# Patient Record
Sex: MALE | Race: WHITE
[De-identification: names, ages, dates, MRNs, and addresses within clinical notes are randomized per-mention and may not be internally consistent; named-entity substitution may affect disease eponyms.]

---

## 2021-01-22 ENCOUNTER — HOSPITAL ENCOUNTER (EMERGENCY)
Dept: HOSPITAL 62 - ER | Age: 66
Discharge: HOME | End: 2021-01-22
Payer: SELF-PAY

## 2021-01-22 VITALS — SYSTOLIC BLOOD PRESSURE: 130 MMHG | DIASTOLIC BLOOD PRESSURE: 86 MMHG

## 2021-01-22 DIAGNOSIS — K62.89: ICD-10-CM

## 2021-01-22 DIAGNOSIS — K59.09: ICD-10-CM

## 2021-01-22 DIAGNOSIS — R11.0: ICD-10-CM

## 2021-01-22 DIAGNOSIS — M79.605: ICD-10-CM

## 2021-01-22 DIAGNOSIS — Z79.891: ICD-10-CM

## 2021-01-22 DIAGNOSIS — R10.84: Primary | ICD-10-CM

## 2021-01-22 DIAGNOSIS — G89.29: ICD-10-CM

## 2021-01-22 PROCEDURE — S0119 ONDANSETRON 4 MG: HCPCS

## 2021-01-22 PROCEDURE — 74018 RADEX ABDOMEN 1 VIEW: CPT

## 2021-01-22 PROCEDURE — 96374 THER/PROPH/DIAG INJ IV PUSH: CPT

## 2021-01-22 PROCEDURE — 93010 ELECTROCARDIOGRAM REPORT: CPT

## 2021-01-22 PROCEDURE — 93005 ELECTROCARDIOGRAM TRACING: CPT

## 2021-01-22 PROCEDURE — 99285 EMERGENCY DEPT VISIT HI MDM: CPT

## 2021-01-22 NOTE — RADIOLOGY REPORT (SQ)
EXAM DESCRIPTION:  KUB/ABDOMEN (SINGLE VIEW)



IMAGES COMPLETED DATE/TIME:  1/22/2021 11:50 am



REASON FOR STUDY:  abd pain, constipation



COMPARISON:  None.



NUMBER OF VIEWS:  Two views.



TECHNIQUE:  AP supine and upright views of the abdomen were obtained.



LIMITATIONS:  None.



FINDINGS:  BOWEL GAS PATTERN: There is a moderate colorectal fecal burden.  There are no dilated loop
s of bowel or differential fluid levels.

CALCIFICATIONS: Pelvic phlebolith.

SOFT TISSUES: No acute gross abnormality.

HARDWARE: None in the abdomen.

BONES: Degenerative spondylosis of the lumbar spine.

OTHER: No pneumatosis or free intraperitoneal gas.



IMPRESSION:  Nonobstructive bowel gas pattern with a moderate colorectal fecal burden.



TECHNICAL DOCUMENTATION:  JOB ID:  [id number]

 2011 [employer]- All Rights Reserved



Reading location - IP/workstation name: [email or web address]

## 2021-01-22 NOTE — ER DOCUMENT REPORT
ED Medical Screen (RME)





- General


Chief Complaint: Abdominal Pain


Stated Complaint: ABDOMINAL PAIN


Time Seen by Provider: 01/22/21 11:03


Notes: 





Patient presents complaining of constipation for the past week.  Patient reports

abdominal pain, rectal pain and nausea.  Patient states that he has had some 

blood from his rectum.  Patient states he does feel lightheaded as though he 

could pass out.





I have greeted and performed a rapid initial assessment of this patient.  A 

comprehensive ED assessment and evaluation of the patient, analysis of test 

results and completion of the medical decision making process will be conducted 

by additional ED providers.





Physical Exam





- Vital signs


Vitals: 





                                        











Temp Pulse Resp BP Pulse Ox


 


 97.4 F   99   16   165/89 H  99 


 


 01/22/21 10:49  01/22/21 10:49  01/22/21 10:49  01/22/21 10:49  01/22/21 10:49














- General


General appearance: Alert, Anxious


Notes: 





Patient refuses to sit in triage, restless in triage, patient reports rectal 

pain and abdominal tenderness





Course





- Vital Signs


Vital signs: 





                                        











Temp Pulse Resp BP Pulse Ox


 


 97.4 F   99   16   165/89 H  99 


 


 01/22/21 10:49  01/22/21 10:49  01/22/21 10:49  01/22/21 10:49  01/22/21 10:49

## 2021-01-22 NOTE — EKG REPORT
SEVERITY:- ABNORMAL ECG -

SINUS RHYTHM

LEFT ANTERIOR FASCICULAR BLOCK

:

Confirmed by: Adryan Flores MD 22-Jan-2021 12:05:59

## 2021-01-22 NOTE — ER DOCUMENT REPORT
ED GI/





- General


Chief Complaint: Constipation


Stated Complaint: ABDOMINAL PAIN


Time Seen by Provider: 01/22/21 11:03


Notes: 





CHIEF COMPLAINT: Constipation





HPI: 65-year-old male presenting for constipation for 1 week.  Patient is on a 

fentanyl patch for chronic left leg pain.  States occasionally this makes him 

constipated.  He forgot to take stool softeners all week.  Patient states he 

feels slightly bloated but has no abdominal pain he does have nausea.  Patient 

denies any dizziness chest pain shortness of breath.  States that he had some 

rectal discomfort trying to pass stool earlier today which made him feel 

lightheaded because of the pain so he called EMS.  He states that he tried to 

digitally disimpact himself and now is having very slight bleeding which he 

feels might have come from that or from a hemorrhoid.





ROS: See HPI - all other systems were reviewed and are otherwise negative


Constitutional: no fever 


Eyes: no drainage, no blurred vision


ENT: no runny nose, no sore throat


Cardiovascular:  no chest pain 


Resp: no SOB, no cough


GI: no vomiting, no diarrhea, no abdominal pain, positive constipation, positive

nausea


: no dysuria


Integumentary: no rash 


Allergy: no hives 


Musculoskeletal: no extremity pain or swelling 


Neurological: no numbness/tingling, no weakness





MEDICATIONS: I agree with the patient medications as charted by the RN.





ALLERGIES: I agree with the allergies as charted by the RN.





PAST MEDICAL HISTORY/PAST SURGICAL HISTORY: Reviewed and agree as charted by RN.





SOCIAL HISTORY: Reviewed and agree as charted by RN.





FAMILY HISTORY: No significant familial comorbid conditions directly related to 

patient complaint





EXAM:


Reviewed vital signs as charted by RN.


CONSTITUTIONAL: Alert and oriented and responds appropriately to questions. 

Well-appearing; well-nourished


HEAD: Normocephalic; atraumatic


EYES: PERRL; Conjunctivae clear, sclerae non-icteric


ENT: normal nose; no rhinorrhea; moist mucous membranes; pharynx without lesions

noted, no uvula edema or deviation, no tonsillar hypertrophy, phonation normal


NECK: Supple without meningismus; non-tender; no cervical lymphadenopathy, no 

masses


CARD: RRR; no murmurs, no clicks, no rubs, no gallops; symmetric distal pulses


RESP: Normal chest excursion without splinting or tachypnea; breath sounds clear

and equal bilaterally; no wheezes, no rhonchi, no rales, pulse oximetry 97% on 

room air not hypoxic


ABD/GI: Normal bowel sounds; non-distended; soft, non-tender, no rebound, no 

guarding; no palpable organomegaly or masses.


BACK:  The back appears normal and is non-tender to palpation, there is no CVA 

tenderness


EXT: Normal ROM in all joints; non-tender to palpation; no cyanosis, no 

effusions, no edema   


SKIN: Normal color for age and race; warm; dry; good turgor; no acute lesions 

noted


NEURO: Moves all extremities equally; Motor and sensory function intact 


PSYCH: The patient's mood and manner are appropriate. Grooming and personal 

hygiene are appropriate.





MDM: EKG with a heart rate of 91, , , QTc 473.  Left anterior 

fascicular block no other abnormalities.  Abnormal EKG, interpreted by emergency

department physicians.  65-year-old male presenting for evaluation of 

constipation for a week.  Initial screening orders from triage process.  He has 

no abdominal pain, has no dizziness except because of pain when trying to pass a

hard stool.  Will give mag citrate and enema and reassess








- Related Data


Allergies/Adverse Reactions: 


                                        





trazodone Allergy (Verified 01/22/21 11:08)


   











Past Medical History





- Social History


Smoking Status: Unknown if Ever Smoked


Family History: Reviewed & Not Pertinent





Physical Exam





- Vital signs


Vitals: 


                                        











Temp Pulse Resp BP Pulse Ox


 


 97.4 F   99   16   165/89 H  99 


 


 01/22/21 10:49  01/22/21 10:49  01/22/21 10:49  01/22/21 10:49  01/22/21 10:49














Course





- Re-evaluation


Re-evalutation: 





01/22/21 13:28


Patient had a very large bowel movement abdomen is now soft patient states he 

feels much better.  There was no blood mixed with the stool.  Will discharge 

home on senna, follow-up PCP





- Vital Signs


Vital signs: 


                                        











Temp Pulse Resp BP Pulse Ox


 


 97.4 F   99   16   165/89 H  99 


 


 01/22/21 10:49  01/22/21 10:49  01/22/21 10:49  01/22/21 10:49  01/22/21 10:49














- Laboratory Results


Critical Laboratory Results Reviewed: No Critical Results





- Radiology Results


Critical Radiology Results Reviewed: No Critical Results





Discharge





- Discharge


Clinical Impression: 


Abdominal pain


Qualifiers:


 Abdominal location: generalized Qualified Code(s): R10.84 - Generalized 

abdominal pain





Constipation


Qualifiers:


 Constipation type: other constipation type Qualified Code(s): K59.09 - Other 

constipation





Condition: Stable


Disposition: HOME, SELF-CARE


Additional Instructions: 


Take the senna as prescribed to help with bowel movements.  Follow-up with your 

primary care provider for a bowel regimen that you may continue on to prevent 

further episodes of constipation.


Prescriptions: 


Sennosides [Senna] 8.6 mg PO DAILY #10 capsule